# Patient Record
Sex: MALE | Race: OTHER | HISPANIC OR LATINO | Employment: FULL TIME | URBAN - METROPOLITAN AREA
[De-identification: names, ages, dates, MRNs, and addresses within clinical notes are randomized per-mention and may not be internally consistent; named-entity substitution may affect disease eponyms.]

---

## 2017-07-11 ENCOUNTER — ALLSCRIPTS OFFICE VISIT (OUTPATIENT)
Dept: OTHER | Facility: OTHER | Age: 22
End: 2017-07-11

## 2017-07-11 DIAGNOSIS — N47.8 OTHER DISORDERS OF PREPUCE: ICD-10-CM

## 2017-07-11 DIAGNOSIS — K29.70 GASTRITIS WITHOUT BLEEDING: ICD-10-CM

## 2017-07-11 DIAGNOSIS — Z01.89 ENCOUNTER FOR OTHER SPECIFIED SPECIAL EXAMINATIONS: ICD-10-CM

## 2018-01-12 VITALS
HEART RATE: 70 BPM | HEIGHT: 73 IN | TEMPERATURE: 97.3 F | RESPIRATION RATE: 18 BRPM | OXYGEN SATURATION: 98 % | WEIGHT: 187 LBS | BODY MASS INDEX: 24.78 KG/M2 | DIASTOLIC BLOOD PRESSURE: 70 MMHG | SYSTOLIC BLOOD PRESSURE: 118 MMHG

## 2018-07-10 RX ORDER — PANTOPRAZOLE SODIUM 20 MG/1
TABLET, DELAYED RELEASE ORAL
Qty: 30 TABLET | Refills: 1 | OUTPATIENT
Start: 2018-07-10

## 2018-08-17 ENCOUNTER — OFFICE VISIT (OUTPATIENT)
Dept: URGENT CARE | Age: 23
End: 2018-08-17
Payer: COMMERCIAL

## 2018-08-17 VITALS
HEART RATE: 67 BPM | RESPIRATION RATE: 18 BRPM | HEIGHT: 74 IN | BODY MASS INDEX: 23.23 KG/M2 | TEMPERATURE: 97.8 F | OXYGEN SATURATION: 98 % | DIASTOLIC BLOOD PRESSURE: 60 MMHG | SYSTOLIC BLOOD PRESSURE: 131 MMHG | WEIGHT: 181 LBS

## 2018-08-17 DIAGNOSIS — K64.9 HEMORRHOIDS, UNSPECIFIED HEMORRHOID TYPE: Primary | ICD-10-CM

## 2018-08-17 PROCEDURE — 99213 OFFICE O/P EST LOW 20 MIN: CPT | Performed by: PHYSICIAN ASSISTANT

## 2018-08-17 RX ORDER — LIDOCAINE 50 MG/G
OINTMENT TOPICAL AS NEEDED
Qty: 35.44 G | Refills: 0 | Status: SHIPPED | OUTPATIENT
Start: 2018-08-17

## 2018-08-17 NOTE — LETTER
August 17, 2018     Patient: Kiki Lundberg   YOB: 1995   Date of Visit: 8/17/2018       To Whom it May Concern:    Kiki Lundberg was seen in my clinic on 8/17/2018  Please allow patient have a bowel movement if needed  If you have any questions or concerns, please don't hesitate to call           Sincerely,          KIRBY SMALLS        CC: No Recipients

## 2018-08-18 NOTE — PATIENT INSTRUCTIONS
Use medications as direct  Follow up with PCP in 3-5 days  Proceed to  ER if symptoms worsen  Hemorroidectomía   CUIDADO AMBULATORIO:   Lo que usted necesita saber sobre la hemorroidectomía:  Mati Jin hemorroidectomía es [facility] cirugía para extirpar las hemorroides  Cómo me preparo para [place] hemorroidectomía: Zavaleta médico le explicará cómo debe prepararse para la [facility]  Le puede indicar que no consuma ningún alimento ni bebida después de la medianoche del día de la [facility]  Shirley Atkinson qué medicamentos puede wallace el día de la [facility]  Podrían administrarle un antibiótico por vía intravenosa para evitar que contraiga michelle infección bacteriana  Informe a zavaleta médico si usted alguna vez ha tenido michelle reacción alérgica a un antibiótico  [employer] arreglos necesarios para que otra persona lo lleve a zavaleta hogar después de la cirugía  Qué sucederá donaldo la hemorroidectomía:  Pueden administrarle anestesia general para mantenerlo dormido y [place] de dolor donaldo la cirugía  O, en lugar de eso, podrían administrarle anestesia local o raquídea para adormecer el área de la [facility]  Con anestesia local o raquídea, usted todavía podría sentir presión o molestias donaldo la cirugía, ania no debería sentir ningún dolor  El cirujano hará michelle o más incisiones cerca de la hemorroide y [address]  Puede cerrar las incisiones con puntos de sutura o dejarlas abiertas  Puede colocarle gasa en el recto para controlar el sangrado  Young Hint un vendaje sobre las incisiones  Qué sucederá después de michelle hemorroidectomía:  Los médicos lo mantendrán bajo observación hasta que se despierte  Es posible que le den de julian para ir a casa después de la cirugía o que tenga que pasar la noche en el hospital  Fabienne Qualia dolor y sangrado de la incisión donaldo algunos días  Puede tener sangrado con las evacuaciones intestinales donaldo varias semanas  Si tiene [employer], es posible que se la quiten antes de irse a zavaleta casa o en unos días   O, en zavaleta lugar, le pueden indicar que se la quite usted en zavaleta casa  [place] hemorroidectomía:  La hemorroide puede regresar después de la cirugía  Usted podría sangrar más de lo esperado o contraer michelle infección  Puede tener problemas para orinar después de la [place]  Puede que necesite michelle sonda urinaria donaldo unos días deepak ayuda para orinar  Los músculos del recto y el ano pueden dañarse donaldo la [facility] [place]  [place] puede hacer que sea difícil controlar las evacuaciones intestinales  Las herramientas utilizadas para extirpar la hemorroide pueden hacerle michelle perforación en el recto o los intestinos  Es posible que deban hacerle michelle cirugía para reparar esto  Llame al 911 en misha de presentar lo siguiente:   · Usted tiene dificultad para respirar  Busque atención médica de inmediato si:   · La brayden le empapa el vendaje o la ropa interior  · Se desprenden los puntos de sutura  · Usted tiene dolor intenso en el recto o el abdomen  · Usted no puede orinar u orina muy Marielle Marcelina a zavaleta Claryce Levans vitaminas y minerales son adecuados para usted  · Usted tiene fiebre o escalofríos  · Zavaleta dolor no mejora después de wallace zavaleta medicamento para el dolor  · No tiene michelle evacuación intestinal en las 48 horas después de la [place]  · Tiene dolor intenso con la evacuación intestinal      · Zavaleta herida está neel, inflamada o drena pus  · Usted tiene náuseas o está vomitando  · Usted tiene comezón en la piel, inflamación o un sarpullido  · Usted tiene dificultad para controlar mendel evacuaciones intestinales  · Usted tiene preguntas o inquietudes acerca de zavaleta condición o cuidado  Medicamentos:  Es posible que usted necesite alguno de los siguientes:  · Medicamento  para ayudar a disminuir el dolor y la inflamación  El medicamento puede venir deepak almohadilla, crema o ungüento  · Antibióticos  ayudan a evitar michelle infección bacteriana   Podrían administrarse en forma de pastilla o ungüento  · Los laxantes  ayudan a evitar el estreñimiento  · Laxantes  ayudan a tener michelle evacuación intestinal y a prevenir el estreñimiento  · Un medicamento con receta para el dolor  podrían ser Greg Andrea  Pregunte al médico cómo debe wallace hiram medicamento de forma balbuena  Algunos medicamentos recetados para el dolor contienen acetaminofén  No tome otros medicamentos que contengan acetaminofén sin consultarlo con zavaleta médico  Demasiado acetaminofeno puede causar daño al hígado  Los medicamentos recetados para el dolor podrían causar estreñimiento  Pregunte a zavaleta médico deepak prevenir o tratar estreñimiento  · AINEs (Analgésicos antiinflamatorios no esteroides) deepak el ibuprofeno, ayudan a disminuir la inflamación, el dolor y la [place]  Los AINEs pueden causar sangrado estomacal o problemas renales en ciertas personas  Si usted rox un medicamento anticoagulante, siempre pregúntele a zavaleta médico si los [place]OS[place] LU[place] son seguros para usted  Siempre rebeca la etiqueta de hiram medicamento y [place] instrucciones  · Tira mendel medicamentos deepak se le haya indicado  Consulte con zavaleta médico si usted guerrero que zavaleta medicamento no le está ayudando o si presenta efectos secundarios  Infórmele si es alérgico a cualquier medicamento  Mantenga michelle lista actualizada de los [employer], las vitaminas y los productos herbales que rox  Incluya los siguientes datos de los medicamentos: cantidad, frecuencia y motivo de administración  Traiga con usted la lista o los envases de la píldoras a mendel citas de seguimiento  Lleve la lista de los medicamentos con usted en misha de michelle emergencia  Siga las instrucciones de zavaleta médico sobre el cuidado de mendel heridas:   · Quítese el vendaje o las gasas de acuerdo con las indicaciones  Lave cuidadosamente el área alrededor de la herida con [place]  Está shraddha dejar que el agua y jabón corran suavemente por la incisión  Seque el área usando palmaditas suaves   Aplique ungüento o crema según lo indicado  Aplique vendas nuevas y limpias según indicado  Cambie mendel vendajes cuando se mojen o ensucien  · Mantenga limpia el área del ano  Límpiese con [employer] o papel higiénico húmedo después de tener michelle deposición intestinal  El papel higiénico seco podría irritar el área  Use michelle almohadilla sanitaria para absorber el sangrado y [address] el Jerelene Arthur y Fasoula Pafos  Cuidados personales:   · Aplíquese hielo en el ano de 15 a 20 minutos [address] indicaciones  Use un paquete de hielo o ponga hielo molido dentro de [employer]  Envuelva el hielo con michelle toalla antes de colocarlo sobre zaavleta piel  El hielo ayuda a evitar daño al tejido y a disminuir la inflamación y el dolor  · [place] un baño de asiento  Un baño de asiento puede ayudar a aliviar la hinchazón y el dolor  Hágalo 3 veces al día y después de cada evacuación intestinal  Llene michelle marquis de baño con 4 a 6 pulgadas de agua cálida  [address] usar un recipiente para baño de asiento que quepa en el inodoro  Siéntese en el baño de asiento donaldo 15 minutos  · Siéntese sobre michelle almohada o un cojín en forma de brendan  [place] ayuda a aliviar la presión y el dolor en la incisión  Pregunte a zavaleta médico dónde puede comprar un cojín en forma de brendan  Si tiene dolor al sentarse, recuéstese sobre zavaleta costado  · No tenga contacto sexual anal   El contacto sexual anal puede hacer que los puntos de sutura se aston  Pregunte a zavaleta médico por cuánto tiempo necesita seguir estas indicaciones  · No levante nada que pese más que 5 libras  [place] puede aumentar la presión en el recto o el ano y hacer que la incisión se manuela  Prevenga estreñimiento:  Debe intentar tener michelle evacuación intestinal en las 50 horas después de Adline Dub  El estreñimiento puede causar dolor y ejercer presión sobre la incisión  Yvrose lo siguiente para prevenir el estreñimiento:  · [place] suficiente líquidos    Los líquidos pueden ayudar a prevenir el estreñimiento y el esfuerzo  Pregunte cuánto líquido debe wallace cada día y cuáles líquidos son los más adecuados para usted  · Consuma michelle variedad de alimentos ricos en fibra  [place] le ayudará a [employer] intestinos  [facility], se incluyen frutas, vegetales y granos enteros  Pregunte a esquivel médico cuál es la cantidad de fibra que necesita al día  Es posible que deba wallace un suplemento de [place]  · Ejercítese según indicaciones  El hacer ejercicio, deepak caminar, puede servir para que tenga michelle evacuación intestinal  Pregúntele a esquivel médico qué ejercicios son seguros para usted después de Alvaro Hawking  Acuda a mendel consultas de control con esquivel médico según le indicaron  Anote mendel preguntas para que se acuerde de hacerlas donaldo mendel visitas  © 2017 [address] Information is for End User's use only and may not be sold, redistributed or otherwise used for commercial purposes  All illustrations and images included in CareNotes® are the copyrighted property of A D A M , Inc  or Kal Babcock  Esta información es sólo para uso en educación  Esquivel intención no es darle un consejo médico sobre enfermedades o tratamientos  Colsulte con esquivel Hugh Stephen farmacéutico antes de seguir cualquier régimen médico para saber si es seguro y efectivo para usted  Hemorrhoidectomy   AMBULATORY CARE:   What you need to know about a hemorrhoidectomy:  A hemorrhoidectomy is surgery to remove a hemorrhoid  How to prepare for a hemorrhoidectomy:  Your healthcare provider will talk to you about how to prepare for surgery  He may tell you not to eat or drink anything after midnight on the day of your surgery  He will tell you what medicines to take or not take on the day of your surgery  You may be given an antibiotic through your IV to help prevent a bacterial infection  Tell a healthcare provider if you have ever had an allergic reaction to an antibiotic   Arrange for someone to drive you home after surgery  What will happen during a hemorrhoidectomy:  You may be given general anesthesia to keep you asleep and free from pain during surgery  You may instead be given local or spinal anesthesia to numb the surgery area  With local or spinal anesthesia, you may still feel pressure or pushing during surgery, but you should not feel any pain  Your surgeon will make one or more incisions near your hemorrhoid and then remove your hemorrhoid  He may close your incisions with stitches or leave them open  He may place packing in your rectum to control bleeding  A bandage will be placed over your incisions  What will happen after a hemorrhoidectomy:  Healthcare providers will monitor you until you are awake  You may be able to go home after surgery or you may need to spend a night in the hospital  You may have pain and bleeding from your incision for a few days  You may have bleeding with bowel movements for several weeks  If you have packing, it may be removed before you go home or in a few days  You may be told to remove it at home instead  Risks of a hemorrhoidectomy:  Your hemorrhoid may return after surgery  You may bleed more than expected or get an infection  You may have a problem emptying your bladder after surgery  You may need a urinary catheter for a few days to help empty your bladder  Muscles in your rectum and anus may be damaged during surgery  This may make it hard to control your bowel movements  The tools used to remove the hemorrhoid may make a hole in your rectum or bowels  You may need surgery to repair this  Call 911 for any of the following:   · You have trouble breathing  Seek care immediately if:   · Blood soaks through your bandage or underwear  · Your stitches come apart  · You have severe pain in your rectum or abdomen  · You cannot urinate, or you urinate very little  Contact your healthcare provider if:   · You have a fever or chills      · Your pain does not get better after you take pain medicine  · You do not have a bowel movement within 48 hours after surgery  · You have severe pain when you have a bowel movement  · Your wound is red, swollen, or draining pus  · You have nausea or are vomiting  · Your skin is itchy, swollen, or you have a rash  · You have trouble controlling your bowel movements  · You have questions or concerns about your condition or care  Medicines: You may need any of the following:  · Medicine  may be given to decrease pain and swelling  The medicine may come as a pad, cream, or ointment  · Antibiotics  help prevent a bacterial infection  They may be given as a pill or an ointment  · Stool softeners  help prevent constipation  · Laxatives  help you have a bowel movement and prevent constipation  · Prescription pain medicine  may be given  Ask your healthcare provider how to take this medicine safely  Some prescription pain medicines contain acetaminophen  Do not take other medicines that contain acetaminophen without talking to your healthcare provider  Too much acetaminophen may cause liver damage  Prescription pain medicine may cause constipation  Ask your healthcare provider how to prevent or treat constipation  · NSAIDs , such as ibuprofen, help decrease swelling, pain, and fever  NSAIDs can cause stomach bleeding or kidney problems in certain people  If you take blood thinner medicine, always ask your healthcare provider if NSAIDs are safe for you  Always read the medicine label and follow directions  · Take your medicine as directed  Contact your healthcare provider if you think your medicine is not helping or if you have side effects  Tell him or her if you are allergic to any medicine  Keep a list of the medicines, vitamins, and herbs you take  Include the amounts, and when and why you take them  Bring the list or the pill bottles to follow-up visits   Carry your medicine list with you in case of an emergency  Care for your wound as directed:   · Remove your bandage or packing as directed  Carefully wash around the wound with soap and water  It is okay to let soap and water gently run over your incision  Gently pat the area dry  Apply ointment or cream as directed  Put on new, clean bandages as directed  Change your bandages when they get wet or dirty  · Keep your anal area clean  After a bowel movement, wipe with moist towelettes or wet toilet paper  Dry toilet paper can irritate the area  Wear a sanitary pad to absorb bleeding and keep the area clean and dry  Self-care:   · Apply ice on your anus for 15 to 20 minutes every hour or as directed  Use an ice pack, or put crushed ice in a plastic bag  Cover it with a towel before you put it on your skin  Ice helps prevent tissue damage and decreases swelling and pain  · Take a sitz bath  A sitz bath can help decrease pain and swelling  Do this 3 times a day, and after each bowel movement  Fill a bathtub with 4 to 6 inches of warm water  You may also use a sitz bath pan that fits inside a toilet bowl  Sit in the sitz bath for 15 minutes  · Sit on a pillow or a donut-shaped cushion  This helps relieve pressure and pain on your incision  Ask your healthcare provider where to buy a donut-shaped cushion  If you have pain when you sit, lie on your side  · Do not have anal sex  Anal sex can cause your stitches to come apart  Ask your healthcare provider how long you need to follow these instructions  · Do not lift anything heavier than 5 pounds  This can increase pressure in your rectum or anus and cause your incision to come apart  Prevent constipation:  You should try to have a bowel movement within 48 hours after surgery  Constipation can cause pain and put pressure on your incision  Do the following to prevent constipation:  · Drink plenty of liquids  Liquids can help prevent constipation and straining   Ask how much liquid to drink each day and which liquids are best for you  · Eat a variety of high-fiber foods  This will help make it easier to have a bowel movement  Examples include fruits, vegetables, and whole grains  Ask your healthcare provider how much fiber you need each day  You may need to take a fiber supplement  · Exercise as directed  Exercise, such as walking, may make it easier to have a bowel movement  Ask your healthcare provider what exercises are safe for you to do after surgery  Follow up with your healthcare provider as directed:  Write down your questions so you remember to ask them during your visits  © 2017 [address] Information is for End User's use only and may not be sold, redistributed or otherwise used for commercial purposes  All illustrations and images included in CareNotes® are the copyrighted property of A D A M , Inc  or Kal Babcock  The above information is an  only  It is not intended as medical advice for individual conditions or treatments  Talk to your doctor, nurse or pharmacist before following any medical regimen to see if it is safe and effective for you

## 2023-05-31 ENCOUNTER — APPOINTMENT (OUTPATIENT)
Dept: LAB | Facility: CLINIC | Age: 28
End: 2023-05-31
Payer: COMMERCIAL

## 2023-05-31 ENCOUNTER — OFFICE VISIT (OUTPATIENT)
Dept: FAMILY MEDICINE CLINIC | Facility: CLINIC | Age: 28
End: 2023-05-31

## 2023-05-31 VITALS
DIASTOLIC BLOOD PRESSURE: 80 MMHG | WEIGHT: 207.6 LBS | HEART RATE: 74 BPM | SYSTOLIC BLOOD PRESSURE: 126 MMHG | OXYGEN SATURATION: 98 % | BODY MASS INDEX: 26.64 KG/M2 | TEMPERATURE: 98.3 F | RESPIRATION RATE: 18 BRPM | HEIGHT: 74 IN

## 2023-05-31 DIAGNOSIS — Z00.00 ANNUAL PHYSICAL EXAM: Primary | ICD-10-CM

## 2023-05-31 DIAGNOSIS — Z83.3 FAMILY HISTORY OF DIABETES MELLITUS (DM): ICD-10-CM

## 2023-05-31 DIAGNOSIS — Z11.4 SCREENING FOR HIV (HUMAN IMMUNODEFICIENCY VIRUS): ICD-10-CM

## 2023-05-31 DIAGNOSIS — Z00.00 ANNUAL PHYSICAL EXAM: ICD-10-CM

## 2023-05-31 DIAGNOSIS — Z11.59 NEED FOR HEPATITIS C SCREENING TEST: ICD-10-CM

## 2023-05-31 DIAGNOSIS — K64.9 HEMORRHOIDS, UNSPECIFIED HEMORRHOID TYPE: ICD-10-CM

## 2023-05-31 DIAGNOSIS — M25.512 PAIN, JOINT, SHOULDER, LEFT: ICD-10-CM

## 2023-05-31 LAB
ALBUMIN SERPL BCP-MCNC: 3.9 G/DL (ref 3.5–5)
ALP SERPL-CCNC: 75 U/L (ref 46–116)
ALT SERPL W P-5'-P-CCNC: 64 U/L (ref 12–78)
ANION GAP SERPL CALCULATED.3IONS-SCNC: 2 MMOL/L (ref 4–13)
AST SERPL W P-5'-P-CCNC: 29 U/L (ref 5–45)
BASOPHILS # BLD AUTO: 0.02 THOUSANDS/ÂΜL (ref 0–0.1)
BASOPHILS NFR BLD AUTO: 1 % (ref 0–1)
BILIRUB SERPL-MCNC: 0.86 MG/DL (ref 0.2–1)
BUN SERPL-MCNC: 12 MG/DL (ref 5–25)
CALCIUM SERPL-MCNC: 9.2 MG/DL (ref 8.3–10.1)
CHLORIDE SERPL-SCNC: 105 MMOL/L (ref 96–108)
CHOLEST SERPL-MCNC: 169 MG/DL
CO2 SERPL-SCNC: 30 MMOL/L (ref 21–32)
CREAT SERPL-MCNC: 1.2 MG/DL (ref 0.6–1.3)
CREAT UR-MCNC: 229 MG/DL
EOSINOPHIL # BLD AUTO: 0.16 THOUSAND/ÂΜL (ref 0–0.61)
EOSINOPHIL NFR BLD AUTO: 5 % (ref 0–6)
ERYTHROCYTE [DISTWIDTH] IN BLOOD BY AUTOMATED COUNT: 13.1 % (ref 11.6–15.1)
GFR SERPL CREATININE-BSD FRML MDRD: 81 ML/MIN/1.73SQ M
GLUCOSE P FAST SERPL-MCNC: 95 MG/DL (ref 65–99)
HCT VFR BLD AUTO: 47.5 % (ref 36.5–49.3)
HCV AB SER QL: NORMAL
HDLC SERPL-MCNC: 42 MG/DL
HGB BLD-MCNC: 15.4 G/DL (ref 12–17)
HIV 1+2 AB+HIV1 P24 AG SERPL QL IA: NORMAL
HIV 2 AB SERPL QL IA: NORMAL
HIV1 AB SERPL QL IA: NORMAL
HIV1 P24 AG SERPL QL IA: NORMAL
IMM GRANULOCYTES # BLD AUTO: 0.01 THOUSAND/UL (ref 0–0.2)
IMM GRANULOCYTES NFR BLD AUTO: 0 % (ref 0–2)
LDLC SERPL CALC-MCNC: 110 MG/DL (ref 0–100)
LYMPHOCYTES # BLD AUTO: 1.5 THOUSANDS/ÂΜL (ref 0.6–4.47)
LYMPHOCYTES NFR BLD AUTO: 50 % (ref 14–44)
MCH RBC QN AUTO: 26 PG (ref 26.8–34.3)
MCHC RBC AUTO-ENTMCNC: 32.4 G/DL (ref 31.4–37.4)
MCV RBC AUTO: 80 FL (ref 82–98)
MICROALBUMIN UR-MCNC: 8.4 MG/L (ref 0–20)
MICROALBUMIN/CREAT 24H UR: 4 MG/G CREATININE (ref 0–30)
MONOCYTES # BLD AUTO: 0.34 THOUSAND/ÂΜL (ref 0.17–1.22)
MONOCYTES NFR BLD AUTO: 11 % (ref 4–12)
NEUTROPHILS # BLD AUTO: 1 THOUSANDS/ÂΜL (ref 1.85–7.62)
NEUTS SEG NFR BLD AUTO: 33 % (ref 43–75)
NONHDLC SERPL-MCNC: 127 MG/DL
NRBC BLD AUTO-RTO: 0 /100 WBCS
PLATELET # BLD AUTO: 210 THOUSANDS/UL (ref 149–390)
PMV BLD AUTO: 12.6 FL (ref 8.9–12.7)
POTASSIUM SERPL-SCNC: 3.9 MMOL/L (ref 3.5–5.3)
PROT SERPL-MCNC: 7.6 G/DL (ref 6.4–8.4)
RBC # BLD AUTO: 5.93 MILLION/UL (ref 3.88–5.62)
SODIUM SERPL-SCNC: 137 MMOL/L (ref 135–147)
TRIGL SERPL-MCNC: 87 MG/DL
TSH SERPL DL<=0.05 MIU/L-ACNC: 1.71 UIU/ML (ref 0.45–4.5)
WBC # BLD AUTO: 3.03 THOUSAND/UL (ref 4.31–10.16)

## 2023-05-31 PROCEDURE — 85025 COMPLETE CBC W/AUTO DIFF WBC: CPT

## 2023-05-31 PROCEDURE — 80053 COMPREHEN METABOLIC PANEL: CPT

## 2023-05-31 PROCEDURE — 36415 COLL VENOUS BLD VENIPUNCTURE: CPT

## 2023-05-31 PROCEDURE — 82570 ASSAY OF URINE CREATININE: CPT

## 2023-05-31 PROCEDURE — 80061 LIPID PANEL: CPT

## 2023-05-31 PROCEDURE — 84443 ASSAY THYROID STIM HORMONE: CPT

## 2023-05-31 PROCEDURE — 87389 HIV-1 AG W/HIV-1&-2 AB AG IA: CPT

## 2023-05-31 PROCEDURE — 82043 UR ALBUMIN QUANTITATIVE: CPT

## 2023-05-31 PROCEDURE — 86803 HEPATITIS C AB TEST: CPT

## 2023-05-31 NOTE — PROGRESS NOTES
[facility]    NAME: Hattie Lara  AGE: 29 y o  SEX: male  : 1995     DATE: 2023     Assessment and Plan:     Problem List Items Addressed This Visit    None  Visit Diagnoses     Annual physical exam    -  Primary    Relevant Orders    Hepatitis C Antibody    HIV 1/2 AG/AB w Reflex [facility] for 2 yr old and above    CBC and differential    Comprehensive metabolic panel    Lipid panel    Albumin / creatinine urine ratio    TSH, 3rd generation with Free T4 reflex    Hemorrhoids, unspecified hemorrhoid type        Relevant Medications    hydrocortisone 2 5 % cream    Need for hepatitis C screening test        Relevant Orders    Hepatitis C Antibody    Screening for HIV (human immunodeficiency virus)        Relevant Orders    HIV 1/2 AG/AB w Reflex [facility] for 2 yr old and above    Family history of diabetes mellitus (DM)        Relevant Orders    Hepatitis C Antibody    HIV 1/2 AG/AB w Reflex [facility] for 2 yr old and above    CBC and differential    Comprehensive metabolic panel    Lipid panel    Albumin / creatinine urine ratio    TSH, 3rd generation with Free T4 reflex    Pain, joint, shoulder, left        Relevant Medications    Diclofenac Sodium (VOLTAREN) 1 %          Immunizations and preventive care screenings were discussed with patient today  Appropriate education was printed on patient's after visit summary  Counseling:  Dental Health: discussed importance of regular tooth brushing, flossing, and dental visits  No follow-ups on file  Chief Complaint:     Chief Complaint   Patient presents with   • New Patient Visit   • Shoulder Pain      History of Present Illness:     Adult Annual Physical   Patient here for a comprehensive physical exam  The patient reports problems - L shoulder pain  Diet and Physical Activity  Diet/Nutrition: well balanced diet  Exercise: no formal exercise  Depression Screening  PHQ-2/9 Depression Screening    Little interest or pleasure in doing things: 0 - not at all  Feeling down, depressed, or hopeless: 0 - not at all  PHQ-2 Score: 0  PHQ-2 Interpretation: Negative depression screen       General Health  Sleep: sleeps well  Hearing: normal - bilateral   Vision: no vision problems  Dental: no dental visits for >1 year   Health  History of STDs?: no      Review of Systems:     Review of Systems   Past Medical History:     Past Medical History:   Diagnosis Date   • Hemorrhoid       Past Surgical History:     Past Surgical History:   Procedure Laterality Date   • LEG SURGERY        Social History:     Social History     Socioeconomic History   • Marital status: /Civil Union     Spouse name: None   • Number of children: None   • Years of education: None   • Highest education level: None   Occupational History   • None   Tobacco Use   • Smoking status: Never   • Smokeless tobacco: Never   Substance and Sexual Activity   • Alcohol use: No   • Drug use: No   • Sexual activity: None   Other Topics Concern   • None   Social History Narrative   • None     Social Determinants of Health     Financial Resource Strain: Low Risk  (5/31/2023)    Overall Financial Resource Strain (CARDIA)    • Difficulty of Paying Living Expenses: Not hard at all   Food Insecurity: No Food Insecurity (5/31/2023)    Hunger Vital Sign    • Worried About Running Out of Food in the Last Year: Never true    • Ran Out of Food in the Last Year: Never true   Transportation Needs: No Transportation Needs (5/31/2023)    PRAPARE - Transportation    • Lack of Transportation (Medical): No    • Lack of Transportation (Non-Medical):  No   Physical Activity: Not on file   Stress: Not on file   Social Connections: Not on file   Intimate Partner Violence: Not on file   Housing Stability: Not on file      Family History:     Family History   Problem Relation Age of Onset   • No Known Problems "Mother    • No Known Problems Father    • No Known Problems Sister    • No Known Problems Brother    • Cancer Maternal Grandmother    • Diabetes Paternal Grandmother       Current Medications:     Current Outpatient Medications   Medication Sig Dispense Refill   • Diclofenac Sodium (VOLTAREN) 1 % Apply 2 g topically 4 (four) times a day 350 g 1   • hydrocortisone 2 5 % cream Apply topically 3 (three) times a day 28 g 1   • lidocaine (XYLOCAINE) 5 % ointment Apply topically as needed for mild pain 35 44 g 0     No current facility-administered medications for this visit  Allergies: Allergies   Allergen Reactions   • Sea-Omega [Fish Oil - Food Allergy] Hives      Physical Exam:     /80 (BP Location: Right arm, Patient Position: Sitting, Cuff Size: Standard)   Pulse 74   Temp 98 3 °F (36 8 °C) (Temporal)   Resp 18   Ht 6' 2\" (1 88 m)   Wt 94 2 kg (207 lb 9 6 oz)   SpO2 98%   BMI 26 65 kg/m²     Physical Exam  Vitals and nursing note reviewed  Constitutional:       General: He is not in acute distress  Appearance: He is well-developed  HENT:      Head: Normocephalic and atraumatic  Eyes:      Conjunctiva/sclera: Conjunctivae normal    Cardiovascular:      Rate and Rhythm: Normal rate and regular rhythm  Heart sounds: No murmur heard  Pulmonary:      Effort: Pulmonary effort is normal  No respiratory distress  Breath sounds: Normal breath sounds  Abdominal:      Palpations: Abdomen is soft  Tenderness: There is no abdominal tenderness  Musculoskeletal:         General: No swelling  Cervical back: Neck supple  Skin:     General: Skin is warm and dry  Capillary Refill: Capillary refill takes less than 2 seconds  Neurological:      Mental Status: He is alert  Psychiatric:         Mood and Affect: Mood normal           MD [place] 70 BMI Counseling: Body mass index is 26 65 kg/m²   The BMI is above normal  " Nutrition recommendations include reducing portion sizes, decreasing overall calorie intake, 3-5 servings of fruits/vegetables daily, reducing fast food intake, consuming healthier snacks, decreasing soda and/or juice intake, moderation in carbohydrate intake, increasing intake of lean protein, reducing intake of saturated fat and trans fat and reducing intake of cholesterol  Exercise recommendations include moderate aerobic physical activity for 150 minutes/week, exercising 3-5 times per week and strength training exercises

## 2023-05-31 NOTE — PROGRESS NOTES
[facility]        NAME: Keegan Morales is a 21 y o  male  : 1995    MRN: [id number]  DATE: 2018  TIME: 8:23 PM    Assessment and Plan   Hemorrhoids, unspecified hemorrhoid type [K64 9]  1  Hemorrhoids, unspecified hemorrhoid type  hydrocortisone (ANUSOL-HC, PROCTOSOL HC,) 2 5 % rectal cream    lidocaine (XYLOCAINE) 5 % ointment         Patient Instructions     Use medications as direct  Follow up with PCP in 3-5 days  Proceed to  ER if symptoms worsen  Chief Complaint   No chief complaint on file  History of Present Illness       19-year-old male presents with a several month history of intermittent abdominal pain  Patient also reports noticed some blood when he wiped a couple times  Patient reports he has mild pain with bowel movement and from time to time in itching and the anus area  No fevers chills nausea vomiting diarrhea  Still eating and drinking normally  Abdominal Pain   This is a recurrent problem  The current episode started more than 1 month ago  The onset quality is sudden  The problem occurs intermittently  The problem has been resolved  The pain is located in the LLQ, RUQ and epigastric region  The pain is mild  The quality of the pain is colicky and aching  The abdominal pain does not radiate  Pertinent negatives include no constipation, diarrhea, flatus, frequency, headaches, melena, myalgias, nausea or vomiting  Nothing aggravates the pain  The pain is relieved by nothing  He has tried nothing for the symptoms  The treatment provided no relief  Review of Systems   Review of Systems   Constitutional: Negative  HENT: Negative  Eyes: Negative  Cardiovascular: Negative  Gastrointestinal: Positive for abdominal pain, blood in stool (On toilet paper) and rectal pain  Negative for constipation, diarrhea, flatus, melena, nausea and vomiting  Genitourinary: Negative  Negative for frequency  Musculoskeletal: Negative    Negative for myalgias  Skin: Negative  Neurological: Negative for headaches  Current Medications       Current Outpatient Prescriptions:     hydrocortisone (ANUSOL-HC, PROCTOSOL HC,) 2 5 % rectal cream, Insert into the rectum 2 (two) times a day, Disp: 30 g, Rfl: 0    lidocaine (XYLOCAINE) 5 % ointment, Apply topically as needed for mild pain, Disp: 35 44 g, Rfl: 0    Current Allergies     Allergies as of 08/17/2018    (No Known Allergies)            The following portions of the patient's history were reviewed and updated as appropriate: allergies, current medications, past family history, past medical history, past social history, past surgical history and problem list      History reviewed  No pertinent past medical history  No past surgical history on file  Family History   Problem Relation Age of Onset    No Known Problems Mother     No Known Problems Father     No Known Problems Sister     No Known Problems Brother     Cancer Maternal Grandmother     Diabetes Paternal Grandmother          Medications have been verified  Objective   /60 (BP Location: Left arm, Patient Position: Sitting, Cuff Size: Adult)   Pulse 67   Temp 97 8 °F (36 6 °C) (Tympanic)   Resp 18   Ht 6' 2" (1 88 m)   Wt 82 1 kg (181 lb)   SpO2 98%   BMI 23 24 kg/m²        Physical Exam     Physical Exam   Constitutional: He is oriented to person, place, and time  He appears well-developed and well-nourished  No distress  HENT:   Head: Normocephalic and atraumatic  Right Ear: External ear normal    Left Ear: External ear normal    Nose: Nose normal    Mouth/Throat: Oropharynx is clear and moist  No oropharyngeal exudate  Eyes: Conjunctivae are normal  Right eye exhibits no discharge  Left eye exhibits no discharge  Neck: Normal range of motion  Neck supple  Cardiovascular: Normal rate, regular rhythm, normal heart sounds and intact distal pulses  No murmur heard    Pulmonary/Chest: Effort normal and breath sounds normal  No respiratory distress  He has no wheezes  He has no rales  Abdominal: Soft  Bowel sounds are normal  There is no tenderness  Genitourinary: Rectal exam shows external hemorrhoid (Small external hemorrhoid noted  At 6 o'clock)  Musculoskeletal: Normal range of motion  Lymphadenopathy:     He has no cervical adenopathy  Neurological: He is alert and oriented to person, place, and time  Skin: Skin is warm and dry  Psychiatric: He has a normal mood and affect  Nursing note and vitals reviewed  No

## 2024-06-05 ENCOUNTER — HOSPITAL ENCOUNTER (EMERGENCY)
Facility: HOSPITAL | Age: 29
Discharge: HOME | End: 2024-06-05
Attending: EMERGENCY MEDICINE
Payer: COMMERCIAL

## 2024-06-05 VITALS
RESPIRATION RATE: 22 BRPM | DIASTOLIC BLOOD PRESSURE: 98 MMHG | SYSTOLIC BLOOD PRESSURE: 158 MMHG | HEART RATE: 72 BPM | TEMPERATURE: 98.2 F | OXYGEN SATURATION: 95 %
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
## 2024-06-05 PROCEDURE — 96372 THER/PROPH/DIAG INJ SC/IM: CPT

## 2024-06-05 PROCEDURE — 99284 EMERGENCY DEPT VISIT MOD MDM: CPT

## 2024-06-05 PROCEDURE — 99284 EMERGENCY DEPT VISIT MOD MDM: CPT | Performed by: EMERGENCY MEDICINE

## 2024-06-05 RX ORDER — KETOROLAC TROMETHAMINE 30 MG/ML
15 INJECTION, SOLUTION INTRAMUSCULAR; INTRAVENOUS ONCE
Status: COMPLETED | OUTPATIENT
Start: 2024-06-05 | End: 2024-06-05

## 2024-06-05 RX ADMIN — KETOROLAC TROMETHAMINE 15 MG: 30 INJECTION, SOLUTION INTRAMUSCULAR; INTRAVENOUS at 19:19

## 2024-06-05 NOTE — Clinical Note
Jose Bennett was seen and treated in our emergency department on 6/5/2024.                Diagnosis:     Jose  may return to work on return date.    He may return on this date: 06/07/2024         If you have any questions or concerns, please don't hesitate to call.      hCris Chahal MD    ______________________________           _______________          _______________  Hospital Representative                              Date                                Time
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
## 2024-08-01 ENCOUNTER — OFFICE VISIT (OUTPATIENT)
Dept: FAMILY MEDICINE CLINIC | Facility: CLINIC | Age: 29
End: 2024-08-01

## 2024-08-01 VITALS
RESPIRATION RATE: 16 BRPM | TEMPERATURE: 98 F | DIASTOLIC BLOOD PRESSURE: 84 MMHG | OXYGEN SATURATION: 98 % | HEIGHT: 74 IN | WEIGHT: 214 LBS | HEART RATE: 66 BPM | SYSTOLIC BLOOD PRESSURE: 134 MMHG | BODY MASS INDEX: 27.46 KG/M2

## 2024-08-01 DIAGNOSIS — M67.912 TENDINOPATHY OF LEFT SHOULDER: ICD-10-CM

## 2024-08-01 DIAGNOSIS — M54.42 CHRONIC LEFT-SIDED LOW BACK PAIN WITH LEFT-SIDED SCIATICA: Primary | ICD-10-CM

## 2024-08-01 DIAGNOSIS — F41.9 ANXIETY: ICD-10-CM

## 2024-08-01 DIAGNOSIS — G89.29 CHRONIC LEFT-SIDED LOW BACK PAIN WITH LEFT-SIDED SCIATICA: Primary | ICD-10-CM

## 2024-08-01 DIAGNOSIS — M54.12 CERVICAL RADICULOPATHY: ICD-10-CM

## 2024-08-01 DIAGNOSIS — K64.9 HEMORRHOIDS, UNSPECIFIED HEMORRHOID TYPE: ICD-10-CM

## 2024-08-01 PROCEDURE — 99214 OFFICE O/P EST MOD 30 MIN: CPT

## 2024-08-01 RX ORDER — ESCITALOPRAM OXALATE 10 MG/1
10 TABLET ORAL DAILY
Qty: 90 TABLET | Refills: 0 | Status: SHIPPED | OUTPATIENT
Start: 2024-08-01

## 2024-08-01 RX ORDER — IBUPROFEN 200 MG
600 TABLET ORAL EVERY 6 HOURS PRN
COMMUNITY

## 2024-08-01 RX ORDER — LIDOCAINE 50 MG/G
OINTMENT TOPICAL AS NEEDED
Qty: 35.44 G | Refills: 0 | Status: SHIPPED | OUTPATIENT
Start: 2024-08-01

## 2024-08-01 RX ORDER — METHOCARBAMOL 500 MG/1
500 TABLET, FILM COATED ORAL 4 TIMES DAILY
Qty: 90 TABLET | Refills: 0 | Status: SHIPPED | OUTPATIENT
Start: 2024-08-01

## 2024-08-01 NOTE — PATIENT INSTRUCTIONS
If you would like to be added to the wait list for services within [facility], you will need to contact them directly at [facility] - [phone]    Outpatient Mental Health Resources     [place] Suicide Prevention Lifeline: Dial #149  If you prefer to text, you can reach the Crisis Text Line by texting “[place]” to 144746    ¿Te encuentras en crisis? Envía un mensaje de texto con la palabra AYUDA al [phone] para comunicarte de manera gratuita con un Consejero de Crisis  Apoyo gratuito las 24 horas del día, los 7 días de la semana, al alcance de tu mano.    [facility] CRISIS for mental health emergencies and/or please go to your local Emergency Department Call [phone] if you or a loved one are in a mental health crisis.  You can Visit https://www.[facility].[place].gov/Services/Mental-Health-In-[place]/Documents/Suicide_Prevention_Hotlines.pdf to find 24/7 crisis phone line for other [facility].    [facility]  If you have NO insurance for outpatient Mental Health services you will need to have a liability appointment with [facility] to assess you to qualify for funding. [facility] does NOT provide funding for Medications.  Please call [phone] or [phone] to schedule an intake assessment    [facility]   ? Location 1: [facility] [place] [address] [phone]   ? Location 2: [facility] [address] [place] [address] [phone]        ? English only* Serves ages 4+          ? Accepts Medicare and some commercial plans    [facility]   ? [address] [place] [address] [phone]; [phone]  ? Bilingual English/Angolan Serves ages 5+   ? Accepts Medical Assistance     [facility]   ? [address] [place] [address] [phone]   ? Bilingual English/Angolan Serves ages 14+   ? Accepts Medical Assistance, (Not currently accepting Medicare), & Major Commercial Insurances     [facility]   ? [address]  [place] [address] [phone]        ? Bilingual English/Cambodian Serves ages 6+   ? Accepts Medical Assistance & Commercial Insurance     [facility]   ? Previous [address] location is closed  ? [address] [facility], [address] [phone]   ? Bilingual English/Cambodian Serves ages 3+   ? Accepts Medical Assistance & Some Commercial Insurance     [facility]   ? [address] [facility] [address] [place] [address] [phone]   ? Bilingual English/Cambodian Serves ages 5+   ? Accepts Medical Assistance     [facility]   ? [address] [place] [address] [phone]  ? Bilingual English/Cambodian Serves ages 3+   ? Accepts Medical Assistance & Some Commercial Insurance     [facility]AS[facility]   ? Location 1: [address] [place] [address] [phone]        ? Location 2: [address] [place] [address]   ? Bilingual English/Cambodian Serves ages 18+  ? Accepts Medical Assistance    [facility], [facility]  ? [address], [place] [address] [phone]  ? Bilingual English/Cambodian  ? Accepts Aetna, Cigna, [facility], [facility], [employer], Medicare, [employer]/[facility], [facility]. No Medical Assistance    [facility] ([phone])  Medicare/Medicare Advantage, Medical Assistance/HealthChoices, Commercial, and self-pay as payment.    [facility]         ? [address], at [facility], [place] [address]; [phone]         ? Bilingual English/Cambodian Serves ages 6+         ? Accepts Medical Assistance     TEEN HELP LINE  ? 0-006-965-[phone]    CRIME VICTIMS [facility]       ? [phone]       ? 24/7 [facility] Hotline    [facility]       ? [phone]       ? 24/7 [facility]ocate Hotline    www.psychologytoday.Zinkia is a resource to find psychotherapy providers, patients can filter therapist search list based on a number of criteria including language, specialty, gender, insurance, etc.  Individuals seeking will need to reach out to perspective providers through information in the directory. You are encouraged to contact multiple providers to given that many providers have a significant wait list for services as well as to find a provider is a good fit for you!    [facility] is an organization of families, friends and individuals whose lives have been affected by mental illness. Together, we advocate for better lives for those individuals who have a m)ental illness. Visit: [facility].org to learn more or to search for local support resources including qualified mental health providers.
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
## 2024-08-01 NOTE — ASSESSMENT & PLAN NOTE
GAD7 score is 17. Neg phq9  - Reviewed lifestyle management such as exercise, meditation, drinking 64 oz of water daily, and following a healthy diet.  Start Lexapro 10 mg daily.  Discussed expected outcomes and possible side effects.   Provided patient with list of local mental health resources

## 2024-08-05 ENCOUNTER — HOSPITAL ENCOUNTER (OUTPATIENT)
Dept: RADIOLOGY | Facility: HOSPITAL | Age: 29
Discharge: HOME | End: 2024-08-05
Payer: COMMERCIAL

## 2024-08-05 ENCOUNTER — APPOINTMENT (OUTPATIENT)
Dept: LAB | Facility: HOSPITAL | Age: 29
End: 2024-08-05
Payer: COMMERCIAL

## 2024-08-05 DIAGNOSIS — F41.9 ANXIETY: ICD-10-CM

## 2024-08-05 DIAGNOSIS — M67.912 TENDINOPATHY OF LEFT SHOULDER: ICD-10-CM

## 2024-08-05 DIAGNOSIS — G89.29 CHRONIC LEFT-SIDED LOW BACK PAIN WITH LEFT-SIDED SCIATICA: ICD-10-CM

## 2024-08-05 DIAGNOSIS — M54.12 CERVICAL RADICULOPATHY: ICD-10-CM

## 2024-08-05 DIAGNOSIS — M54.42 CHRONIC LEFT-SIDED LOW BACK PAIN WITH LEFT-SIDED SCIATICA: ICD-10-CM

## 2024-08-05 LAB
25(OH)D3 SERPL-MCNC: 21.5 NG/ML (ref 30–100)
ALBUMIN SERPL BCG-MCNC: 4.6 G/DL (ref 3.5–5)
ALP SERPL-CCNC: 66 U/L (ref 34–104)
ALT SERPL W P-5'-P-CCNC: 56 U/L (ref 7–52)
ANION GAP SERPL CALCULATED.3IONS-SCNC: 9 MMOL/L (ref 4–13)
AST SERPL W P-5'-P-CCNC: 24 U/L (ref 13–39)
BASOPHILS # BLD AUTO: 0.04 THOUSANDS/ÂΜL (ref 0–0.1)
BASOPHILS NFR BLD AUTO: 1 % (ref 0–1)
BILIRUB SERPL-MCNC: 1.07 MG/DL (ref 0.2–1)
BUN SERPL-MCNC: 14 MG/DL (ref 5–25)
CALCIUM SERPL-MCNC: 9.8 MG/DL (ref 8.4–10.2)
CHLORIDE SERPL-SCNC: 102 MMOL/L (ref 96–108)
CHOLEST SERPL-MCNC: 196 MG/DL
CO2 SERPL-SCNC: 28 MMOL/L (ref 21–32)
CREAT SERPL-MCNC: 1.08 MG/DL (ref 0.6–1.3)
EOSINOPHIL # BLD AUTO: 0.1 THOUSAND/ÂΜL (ref 0–0.61)
EOSINOPHIL NFR BLD AUTO: 3 % (ref 0–6)
ERYTHROCYTE [DISTWIDTH] IN BLOOD BY AUTOMATED COUNT: 13 % (ref 11.6–15.1)
GFR SERPL CREATININE-BSD FRML MDRD: 92 ML/MIN/1.73SQ M
GLUCOSE SERPL-MCNC: 115 MG/DL (ref 65–140)
HCT VFR BLD AUTO: 47.6 % (ref 36.5–49.3)
HDLC SERPL-MCNC: 42 MG/DL
HGB BLD-MCNC: 15.9 G/DL (ref 12–17)
IMM GRANULOCYTES # BLD AUTO: 0.01 THOUSAND/UL (ref 0–0.2)
IMM GRANULOCYTES NFR BLD AUTO: 0 % (ref 0–2)
LDLC SERPL CALC-MCNC: 126 MG/DL (ref 0–100)
LYMPHOCYTES # BLD AUTO: 1.44 THOUSANDS/ÂΜL (ref 0.6–4.47)
LYMPHOCYTES NFR BLD AUTO: 45 % (ref 14–44)
MCH RBC QN AUTO: 25.6 PG (ref 26.8–34.3)
MCHC RBC AUTO-ENTMCNC: 33.4 G/DL (ref 31.4–37.4)
MCV RBC AUTO: 77 FL (ref 82–98)
MONOCYTES # BLD AUTO: 0.3 THOUSAND/ÂΜL (ref 0.17–1.22)
MONOCYTES NFR BLD AUTO: 9 % (ref 4–12)
NEUTROPHILS # BLD AUTO: 1.35 THOUSANDS/ÂΜL (ref 1.85–7.62)
NEUTS SEG NFR BLD AUTO: 42 % (ref 43–75)
NONHDLC SERPL-MCNC: 154 MG/DL
NRBC BLD AUTO-RTO: 0 /100 WBCS
PLATELET # BLD AUTO: 226 THOUSANDS/UL (ref 149–390)
PMV BLD AUTO: 11.4 FL (ref 8.9–12.7)
POTASSIUM SERPL-SCNC: 3.8 MMOL/L (ref 3.5–5.3)
PROT SERPL-MCNC: 7.6 G/DL (ref 6.4–8.4)
RBC # BLD AUTO: 6.22 MILLION/UL (ref 3.88–5.62)
SODIUM SERPL-SCNC: 139 MMOL/L (ref 135–147)
TRIGL SERPL-MCNC: 141 MG/DL
TSH SERPL DL<=0.05 MIU/L-ACNC: 1.13 UIU/ML (ref 0.45–4.5)
WBC # BLD AUTO: 3.24 THOUSAND/UL (ref 4.31–10.16)

## 2024-08-05 PROCEDURE — 72110 X-RAY EXAM L-2 SPINE 4/>VWS: CPT

## 2024-08-05 PROCEDURE — 73030 X-RAY EXAM OF SHOULDER: CPT

## 2024-08-05 PROCEDURE — 80061 LIPID PANEL: CPT

## 2024-08-05 PROCEDURE — 85025 COMPLETE CBC W/AUTO DIFF WBC: CPT

## 2024-08-05 PROCEDURE — 82306 VITAMIN D 25 HYDROXY: CPT

## 2024-08-05 PROCEDURE — 84443 ASSAY THYROID STIM HORMONE: CPT

## 2024-08-05 PROCEDURE — 80053 COMPREHEN METABOLIC PANEL: CPT

## 2024-08-05 PROCEDURE — 72050 X-RAY EXAM NECK SPINE 4/5VWS: CPT

## 2024-08-05 PROCEDURE — 36415 COLL VENOUS BLD VENIPUNCTURE: CPT

## 2024-08-06 DIAGNOSIS — D70.8 OTHER NEUTROPENIA (HCC): Primary | ICD-10-CM

## 2024-08-06 DIAGNOSIS — E55.9 VITAMIN D DEFICIENCY: ICD-10-CM

## 2024-08-09 ENCOUNTER — OFFICE VISIT (OUTPATIENT)
Dept: HEMATOLOGY ONCOLOGY | Facility: CLINIC | Age: 29
End: 2024-08-09
Payer: COMMERCIAL

## 2024-08-09 VITALS
TEMPERATURE: 97.6 F | DIASTOLIC BLOOD PRESSURE: 84 MMHG | RESPIRATION RATE: 18 BRPM | OXYGEN SATURATION: 98 % | BODY MASS INDEX: 27.59 KG/M2 | SYSTOLIC BLOOD PRESSURE: 128 MMHG | HEART RATE: 76 BPM | WEIGHT: 215 LBS | HEIGHT: 74 IN

## 2024-08-09 DIAGNOSIS — R71.8 ELEVATED RED BLOOD CELL COUNT: ICD-10-CM

## 2024-08-09 DIAGNOSIS — D70.8 OTHER NEUTROPENIA (HCC): Primary | ICD-10-CM

## 2024-08-09 DIAGNOSIS — Z86.19 FREQUENT INFECTIONS: ICD-10-CM

## 2024-08-09 DIAGNOSIS — R61 UNEXPLAINED NIGHT SWEATS: ICD-10-CM

## 2024-08-09 DIAGNOSIS — R71.8 MICROCYTOSIS: ICD-10-CM

## 2024-08-09 DIAGNOSIS — R53.83 FATIGUE, UNSPECIFIED TYPE: ICD-10-CM

## 2024-08-09 PROCEDURE — 99203 OFFICE O/P NEW LOW 30 MIN: CPT

## 2024-08-09 NOTE — PROGRESS NOTES
Oncology Outpatient Consult Note  Jose Bennett 29 y.o. male MRN: @ Encounter: [id number]        Date:  8/9/2024        CC:  Neutropenia      HPI:  Jose Bennett is seen for initial consultation 8/9/2024 regarding neutropenia.  Patient has a PMH significant for chronic back pain, cervical radiculopathy.  He is here with his significant other.  Patient is primarily Polish speaking, office visit completed using  service,  ID# [id number].      Patient only has two sets of labs in his history, which are dated 5/31/2023 and 8/5/2023.  Patient is noted to be neutropenic since 2023.  Patient endorses increased fatigue, frequent infections, increased frequency of headaches, drenching night sweats and chest pain.  Denies fevers, decreased appetite or unintentional weight loss.  Patient denies diarrhea or constipation.  No palpitations or SOB.  Patient denies abnormal bleeding: epistaxis, gingival bleeding, hematuria, dark tarry stools.      Patient reported tenderness RUQ on palpation.  No lymphadenopathy or hepatosplenomegaly noted on assessment.    Patient is a non-smoker, drinks alcohol socially.  Denies a family history of cancer.    Labs:  8/5/2024: WBC 3.24, ANC 1.35, lymphocytes 45%, Hgb 15.9, MCV 77, RBC 6.22, ALT 56, AST 24, T. Bili 1.07    5/31/2023: WBC 3.03, ANC 1.00, lymphocytes 50%, Hgb 15.4, MCV 80, RBC 5.93, ALT 64, AST 29, T. Bili 0.86, HIV panel non-reactive      ROS: As stated in history of present illness otherwise her 14 point review of systems today was negative.    ECOG PS: 0    Test Results:    Imaging: XR spine lumbar minimum 4 views non injury    Result Date: 8/5/2024  Narrative: LUMBAR SPINE INDICATION:   Lumbago with sciatica, left side. Other chronic pain. COMPARISON:  None. VIEWS:  XR SPINE LUMBAR MINIMUM 4 VIEWS NON INJURY FINDINGS: There are 5 non rib bearing lumbar vertebral bodies. There is no evidence of acute fracture or destructive osseous lesion. Alignment is  unremarkable. No significant lumbar degenerative change noted. The pedicles appear intact. Soft tissues are unremarkable.     Impression: Normal examination. Electronically signed: 08/05/2024 08:32 PM Anthony Sloan MD    XR shoulder 2+ vw left    Result Date: 8/5/2024  Narrative: LEFT SHOULDER INDICATION:   Unspecified disorder of synovium and tendon, left shoulder. COMPARISON:  None. VIEWS:  XR SHOULDER 2+ VW LEFT FINDINGS: There is no acute fracture or dislocation. No significant degenerative changes. No lytic or blastic osseous lesion. Soft tissues are unremarkable.     Impression: No acute osseous abnormality. Electronically signed: 08/05/2024 08:32 PM Anthony Sloan MD    XR spine cervical complete 4 or 5 vw non injury    Result Date: 8/5/2024  Narrative: CERVICAL SPINE INDICATION:   Radiculopathy, cervical region. COMPARISON: None. VIEWS:  XR SPINE CERVICAL COMPLETE 4 OR 5 VW NON INJURY Images: 5 FINDINGS: No fracture. Normal alignment without subluxation. The intervertebral disc spaces are preserved. The neuroforamina are patent. The prevertebral soft tissues are within normal limits.  The lung apices are clear.     Impression: Unremarkable cervical spine. Electronically signed: 08/05/2024 03:54 PM Isaac Castellanos MPH,MD      Labs:   Lab Results   Component Value Date    WBC 3.24 (L) 08/05/2024    HGB 15.9 08/05/2024    HCT 47.6 08/05/2024    MCV 77 (L) 08/05/2024     08/05/2024     Lab Results   Component Value Date    K 3.8 08/05/2024     08/05/2024    CO2 28 08/05/2024    BUN 14 08/05/2024    CREATININE 1.08 08/05/2024    GLUF 95 05/31/2023    CALCIUM 9.8 08/05/2024    AST 24 08/05/2024    ALT 56 (H) 08/05/2024    ALKPHOS 66 08/05/2024    EGFR 92 08/05/2024     Active Problems:   Patient Active Problem List   Diagnosis    Hemorrhoid    Anxiety    Tendinopathy of left shoulder    Cervical radiculopathy    Chronic left-sided low back pain with left-sided sciatica       Past Medical History:   Past  Medical History:   Diagnosis Date    Hemorrhoid      Surgical History:   Past Surgical History:   Procedure Laterality Date    LEG SURGERY       Family History:    Family History   Problem Relation Age of Onset    No Known Problems Mother     No Known Problems Father     No Known Problems Sister     No Known Problems Brother     Cancer Maternal Grandmother     Diabetes Paternal Grandmother        Cancer-related family history includes Cancer in his maternal grandmother.    Social History:   Social History     Socioeconomic History    Marital status: /Civil Union     Spouse name: Not on file    Number of children: Not on file    Years of education: Not on file    Highest education level: Not on file   Occupational History    Not on file   Tobacco Use    Smoking status: Never    Smokeless tobacco: Never   Vaping Use    Vaping status: Never Used   Substance and Sexual Activity    Alcohol use: No    Drug use: No    Sexual activity: Not on file   Other Topics Concern    Not on file   Social History Narrative    Not on file     Social Determinants of Health     Financial Resource Strain: Low Risk  (8/1/2024)    Overall Financial Resource Strain (CARDIA)     Difficulty of Paying Living Expenses: Not hard at all   Food Insecurity: No Food Insecurity (8/1/2024)    Hunger Vital Sign     Worried About Running Out of Food in the Last Year: Never true     Ran Out of Food in the Last Year: Never true   Transportation Needs: No Transportation Needs (8/1/2024)    PRAPARE - Transportation     Lack of Transportation (Medical): No     Lack of Transportation (Non-Medical): No   Physical Activity: Not on file   Stress: Not on file   Social Connections: Not on file   Intimate Partner Violence: Not on file   Housing Stability: Low Risk  (8/1/2024)    Housing Stability Vital Sign     Unable to Pay for Housing in the Last Year: No     Number of Times Moved in the Last Year: 1     Homeless in the Last Year: No       Current  Medications:   Current Outpatient Medications   Medication Sig Dispense Refill    Cholecalciferol (VITAMIN D3) 1,000 units tablet Take 1 tablet (1,000 Units total) by mouth daily 90 tablet 0    Diclofenac Sodium (VOLTAREN) 1 % Apply 2 g topically 4 (four) times a day 350 g 1    escitalopram (Lexapro) 10 mg tablet Take 1 tablet (10 mg total) by mouth daily 90 tablet 0    hydrocortisone 2.5 % cream Apply topically 3 (three) times a day 28 g 1    ibuprofen (MOTRIN) 200 mg tablet Take 600 mg by mouth every 6 (six) hours as needed      lidocaine (XYLOCAINE) 5 % ointment Apply topically as needed for mild pain 35.44 g 0    methocarbamol (ROBAXIN) 500 mg tablet Take 1 tablet (500 mg total) by mouth 4 (four) times a day 90 tablet 0     No current facility-administered medications for this visit.       Allergies:   Allergies   Allergen Reactions    [place] [Fish Oil - Food Allergy] Hives         Physical Exam:    Body surface area is 2.24 meters squared.    Wt Readings from Last 3 Encounters:   08/09/24 97.5 kg (215 lb)   08/01/24 97.1 kg (214 lb)   05/31/23 94.2 kg (207 lb 9.6 oz)        Temp Readings from Last 3 Encounters:   08/09/24 97.6 °F (36.4 °C) (Temporal)   08/01/24 98 °F (36.7 °C) (Temporal)   06/05/24 98.2 °F (36.8 °C) (Oral)        BP Readings from Last 3 Encounters:   08/09/24 128/84   08/01/24 134/84   06/05/24 158/98         Pulse Readings from Last 3 Encounters:   08/09/24 76   08/01/24 66   06/05/24 72     @LASTSAO2(3)@    Physical Exam     Constitutional   General appearance: No acute distress, well appearing and well nourished.    Eyes   Conjunctiva and lids: No swelling, erythema or discharge.    Pupils and irises: Equal, round and reactive to light.    Ears, Nose, Mouth, and Throat   External inspection of ears and nose: Normal.    Nasal mucosa, septum, and turbinates: Normal without edema or erythema.    Oropharynx: Normal with no erythema, edema, exudate or lesions.    Pulmonary   Respiratory  effort: No increased work of breathing or signs of respiratory distress.    Auscultation of lungs: Clear to auscultation.    Cardiovascular   Palpation of heart: Normal PMI, no thrills.    Auscultation of heart: Normal rate and rhythm, normal S1 and S2, without murmurs.    Examination of extremities for edema and/or varicosities: Normal.    Carotid pulses: Normal.    Abdomen   Abdomen: Non-tender, no masses.    Liver and spleen: No hepatomegaly or splenomegaly.    Lymphatic   Palpation of lymph nodes in neck: No lymphadenopathy.    Musculoskeletal   Gait and station: Normal.    Digits and nails: Normal without clubbing or cyanosis.    Inspection/palpation of joints, bones, and muscles: Normal.    Skin   Skin and subcutaneous tissue: Normal without rashes or lesions.    Neurologic   Cranial nerves: Cranial nerves 2-12 intact.    Sensation: No sensory loss.    Psychiatric   Orientation to person, place, and time: Normal.    Mood and affect: Normal.          Assessment/ Plan:  29-year-old male with neutropenia.  Discussed etiologies of neutropenia include, but not limited to, by an inflammatory process, infections, viruses, nutritional malabsorption, autoimmune disorders or bone marrow disorders.  We will further evaluate by obtaining CRP/Sed rate, MATT screen, LDH, vitamin B12 and folate and a flow cytometry.    In addition, patient is noted to have elevated RBCs with microcytosis, which is usually seen in thalassemia.  Patient is agreeable to be tested, order placed.    His symptoms of increased fatigue, drenching night sweats, frequent infections and RUQ tenderness is  concerning.  We will obtain a CT chest abdomen pelvis w contrast to rule out any lymphadenopathy or masses.  Patient is agreeable.      We will see patient back in the office a week after the CT scan to review  the above.  Recommend he get his labs completed prior to the office visit.  Patient agreeable with plan.  Patient aware to contact us for  worsening symptoms or for additional questions/concerns.    I spent 40 minutes in chart review, face to face counseling, coordination of care, and documentation.

## 2024-08-15 ENCOUNTER — HOSPITAL ENCOUNTER (OUTPATIENT)
Dept: CT IMAGING | Facility: HOSPITAL | Age: 29
End: 2024-08-15
Payer: COMMERCIAL

## 2024-08-15 ENCOUNTER — APPOINTMENT (OUTPATIENT)
Dept: LAB | Facility: HOSPITAL | Age: 29
End: 2024-08-15
Payer: COMMERCIAL

## 2024-08-15 DIAGNOSIS — D70.8 OTHER NEUTROPENIA (HCC): ICD-10-CM

## 2024-08-15 DIAGNOSIS — R61 UNEXPLAINED NIGHT SWEATS: ICD-10-CM

## 2024-08-15 DIAGNOSIS — Z86.19 FREQUENT INFECTIONS: ICD-10-CM

## 2024-08-15 DIAGNOSIS — R61 UNEXPLAINED NIGHT SWEATS: Primary | ICD-10-CM

## 2024-08-15 DIAGNOSIS — R53.83 FATIGUE, UNSPECIFIED TYPE: ICD-10-CM

## 2024-08-15 DIAGNOSIS — R71.8 ELEVATED RED BLOOD CELL COUNT: ICD-10-CM

## 2024-08-15 DIAGNOSIS — R71.8 MICROCYTOSIS: ICD-10-CM

## 2024-08-15 LAB
ANA SER QL IA: NEGATIVE
CRP SERPL QL: 4.3 MG/L
ERYTHROCYTE [SEDIMENTATION RATE] IN BLOOD: 13 MM/HOUR (ref 0–14)
FERRITIN SERPL-MCNC: 174 NG/ML (ref 24–336)
FOLATE SERPL-MCNC: 14.1 NG/ML
IRON SATN MFR SERPL: 41 % (ref 15–50)
IRON SERPL-MCNC: 136 UG/DL (ref 50–212)
LDH SERPL-CCNC: 130 U/L (ref 140–271)
TIBC SERPL-MCNC: 331 UG/DL (ref 250–450)
UIBC SERPL-MCNC: 195 UG/DL (ref 155–355)
VIT B12 SERPL-MCNC: 281 PG/ML (ref 180–914)

## 2024-08-15 PROCEDURE — 74177 CT ABD & PELVIS W/CONTRAST: CPT

## 2024-08-15 PROCEDURE — 82728 ASSAY OF FERRITIN: CPT

## 2024-08-15 PROCEDURE — 83615 LACTATE (LD) (LDH) ENZYME: CPT

## 2024-08-15 PROCEDURE — 86038 ANTINUCLEAR ANTIBODIES: CPT

## 2024-08-15 PROCEDURE — 83550 IRON BINDING TEST: CPT

## 2024-08-15 PROCEDURE — 71260 CT THORAX DX C+: CPT

## 2024-08-15 PROCEDURE — 88184 FLOWCYTOMETRY/ TC 1 MARKER: CPT

## 2024-08-15 PROCEDURE — 36415 COLL VENOUS BLD VENIPUNCTURE: CPT

## 2024-08-15 PROCEDURE — 82607 VITAMIN B-12: CPT

## 2024-08-15 PROCEDURE — 82746 ASSAY OF FOLIC ACID SERUM: CPT

## 2024-08-15 PROCEDURE — 86140 C-REACTIVE PROTEIN: CPT

## 2024-08-15 PROCEDURE — 85652 RBC SED RATE AUTOMATED: CPT

## 2024-08-15 PROCEDURE — 83540 ASSAY OF IRON: CPT

## 2024-08-15 RX ADMIN — IOHEXOL 100 ML: 350 INJECTION, SOLUTION INTRAVENOUS at 13:05

## 2024-08-17 LAB — SCAN RESULT: NORMAL

## 2024-08-21 ENCOUNTER — OFFICE VISIT (OUTPATIENT)
Dept: HEMATOLOGY ONCOLOGY | Facility: CLINIC | Age: 29
End: 2024-08-21
Payer: COMMERCIAL

## 2024-08-21 VITALS
OXYGEN SATURATION: 98 % | TEMPERATURE: 98 F | WEIGHT: 218 LBS | RESPIRATION RATE: 18 BRPM | HEIGHT: 74 IN | SYSTOLIC BLOOD PRESSURE: 120 MMHG | HEART RATE: 75 BPM | BODY MASS INDEX: 27.98 KG/M2 | DIASTOLIC BLOOD PRESSURE: 80 MMHG

## 2024-08-21 DIAGNOSIS — E53.8 VITAMIN B12 DEFICIENCY: ICD-10-CM

## 2024-08-21 DIAGNOSIS — D70.8 OTHER NEUTROPENIA (HCC): Primary | ICD-10-CM

## 2024-08-21 DIAGNOSIS — E53.8 FOLATE DEFICIENCY: ICD-10-CM

## 2024-08-21 PROCEDURE — 99214 OFFICE O/P EST MOD 30 MIN: CPT

## 2024-08-21 RX ORDER — LANOLIN ALCOHOL/MO/W.PET/CERES
1000 CREAM (GRAM) TOPICAL DAILY
Qty: 90 TABLET | Refills: 0 | Status: SHIPPED | OUTPATIENT
Start: 2024-08-21

## 2024-08-21 RX ORDER — DIPHENOXYLATE HYDROCHLORIDE AND ATROPINE SULFATE 2.5; .025 MG/1; MG/1
1 TABLET ORAL DAILY
Qty: 90 TABLET | Refills: 0 | Status: SHIPPED | OUTPATIENT
Start: 2024-08-21

## 2024-08-26 PROBLEM — E53.8 VITAMIN B12 DEFICIENCY: Status: ACTIVE | Noted: 2024-08-26

## 2024-08-27 NOTE — PROGRESS NOTES
HEMATOLOGY / ONCOLOGY CLINIC FOLLOW UP NOTE    Primary Care Provider: NEDA Weiner  Referring Provider:    MRN: [id number]  : 1995    Reason for Encounter: Follow-up neutropenia           Interval History: Patient presents for follow-up of his neutropenia.  He has been his significant other.  Patient is primarily Bengali-speaking,  service used during office visit,  ID #[id number].    We had ordered some labs and imaging after the initial consultation on 2024.  Patient is here to review the results.     Labs:   2024:  serum iron 136, iron saturation 41%, ferritin 174, , folate 14.1, vitamin B12 281, MATT screen negative, CRP 4.3, sed rate 13, thalassemia test pending    Flow cytometry: Negative    2024: WBC 3.24, ANC 1.35, lymphocytes 45%, Hgb 15.9, MCV 77, RBC 6.22, ALT 56, AST 24, T. Bili 1.07     2023: WBC 3.03, ANC 1.00, lymphocytes 50%, Hgb 15.4, MCV 80, RBC 5.93, ALT 64, AST 29, T. Bili 0.86, HIV panel non-reactive    8/15/2024 CT abdomen chest pelvis:  No significant findings in the chest abdomen or pelvis  No lymphadenopathy or suspicious masses noted    REVIEW OF SYSTEMS:  Please note that a 14-point review of systems was performed to include Constitutional, HEENT, Respiratory, CVS, GI, , Musculoskeletal, Integumentary, Neurologic, Rheumatologic, Endocrinologic, Psychiatric, Lymphatic, and Hematologic/Oncologic systems were reviewed and are negative unless otherwise stated in HPI. Positive and negative findings pertinent to this evaluation are incorporated into the history of present illness.      ECOG PS: 0    HPI:  Jose Bennett was seen for initial consultation 2024 regarding neutropenia.  Patient has a PMH significant for chronic back pain, cervical radiculopathy.  He is here with his significant other.  Patient is primarily Bengali speaking, office visit completed using  service,  ID# [id number].       Patient  only has two sets of labs in his history, which are dated 5/31/2023 and 8/5/2023.  Patient is noted to be neutropenic since 2023.  Patient endorses increased fatigue, frequent infections, increased frequency of headaches, drenching night sweats and chest pain.  Denies fevers, decreased appetite or unintentional weight loss.  Patient denies diarrhea or constipation.  No palpitations or SOB.  Patient denies abnormal bleeding: epistaxis, gingival bleeding, hematuria, dark tarry stools.       Patient reported tenderness RUQ on palpation.  No lymphadenopathy or hepatosplenomegaly noted on assessment.     Patient is a non-smoker, drinks alcohol socially.  Denies a family history of cancer.         PROBLEM LIST:  Patient Active Problem List   Diagnosis    Hemorrhoid    Anxiety    Tendinopathy of left shoulder    Cervical radiculopathy    Chronic left-sided low back pain with left-sided sciatica    Other neutropenia (HCC)    Fatigue    Unexplained night sweats    Microcytosis    Frequent infections       Assessment / Plan: 29-year-old male with neutropenia.  We reviewed that his CT imaging was negative for any lymphadenopathy or suspicious masses.  At this time, the etiology of his neutropenia is unclear.  Informed him that his vitamin B12 is on the low end of normal, which could be contributing therefore, recommend he start vitamin B12 1000 mcg daily.  In addition, recommend he start a multivitamin daily as well.    We will have a close follow-up in 2 months with labs prior (CBCD, vitamin B12, folate).  Patient agreeable with this plan.  He is aware to contact us for any additional questions/concerns or worsening symptoms.        I spent 30 minutes on chart review, face to face counseling time, coordination of care and documentation.    Past Medical History:   has a past medical history of Hemorrhoid.    PAST SURGICAL HISTORY:   has a past surgical history that includes Leg Surgery.    CURRENT MEDICATIONS  Current  "Outpatient Medications   Medication Sig Dispense Refill    multivitamin (THERAGRAN) TABS Take 1 tablet by mouth daily 90 tablet 0    vitamin B-12 (VITAMIN B-12) 1,000 mcg tablet Take 1 tablet (1,000 mcg total) by mouth daily 90 tablet 0    Cholecalciferol (VITAMIN D3) 1,000 units tablet Take 1 tablet (1,000 Units total) by mouth daily 90 tablet 0    Diclofenac Sodium (VOLTAREN) 1 % Apply 2 g topically 4 (four) times a day 350 g 1    escitalopram (Lexapro) 10 mg tablet Take 1 tablet (10 mg total) by mouth daily 90 tablet 0    hydrocortisone 2.5 % cream Apply topically 3 (three) times a day 28 g 1    ibuprofen (MOTRIN) 200 mg tablet Take 600 mg by mouth every 6 (six) hours as needed      lidocaine (XYLOCAINE) 5 % ointment Apply topically as needed for mild pain 35.44 g 0    methocarbamol (ROBAXIN) 500 mg tablet Take 1 tablet (500 mg total) by mouth 4 (four) times a day 90 tablet 0     No current facility-administered medications for this visit.     [unfilled]    SOCIAL HISTORY:   reports that he has never smoked. He has never used smokeless tobacco. He reports that he does not drink alcohol and does not use drugs.     FAMILY HISTORY:  family history includes Cancer in his maternal grandmother; Diabetes in his paternal grandmother; No Known Problems in his brother, father, mother, and sister.     ALLERGIES:  is allergic to sea[place] [fish oil - food allergy].      Physical Exam:  Vital Signs:   Visit Vitals  /80 (BP Location: Left arm, Patient Position: Sitting, Cuff Size: Adult)   Pulse 75   Temp 98 °F (36.7 °C)   Resp 18   Ht 6' 2\" (1.88 m)   Wt 98.9 kg (218 lb)   SpO2 98%   BMI 27.99 kg/m²   Smoking Status Never   BSA 2.25 m²     Body mass index is 27.99 kg/m².  Body surface area is 2.25 meters squared.    GEN: Alert, awake oriented x3, in no acute distress  HEENT- No pallor, icterus, cyanosis, no oral mucosal lesions,   LAD - no palpable cervical, clavicle, axillary, inguinal LAD  Heart- normal S1 S2, regular " rate and rhythm, No murmur, rubs.   Lungs- clear breathing sound bilateral.   Abdomen- soft, Non tender, bowel sounds present  Extremities- No cyanosis, clubbing, edema  Neuro- No focal neurological deficit    Labs:  Lab Results   Component Value Date    WBC 3.24 (L) 08/05/2024    HGB 15.9 08/05/2024    HCT 47.6 08/05/2024    MCV 77 (L) 08/05/2024     08/05/2024     Lab Results   Component Value Date    SODIUM 139 08/05/2024    K 3.8 08/05/2024     08/05/2024    CO2 28 08/05/2024    AGAP 9 08/05/2024    BUN 14 08/05/2024    CREATININE 1.08 08/05/2024    GLUC 115 08/05/2024    GLUF 95 05/31/2023    CALCIUM 9.8 08/05/2024    AST 24 08/05/2024    ALT 56 (H) 08/05/2024    ALKPHOS 66 08/05/2024    TP 7.6 08/05/2024    TBILI 1.07 (H) 08/05/2024    EGFR 92 08/05/2024

## 2024-09-12 ENCOUNTER — OFFICE VISIT (OUTPATIENT)
Dept: FAMILY MEDICINE CLINIC | Facility: CLINIC | Age: 29
End: 2024-09-12

## 2024-09-12 VITALS
WEIGHT: 220.5 LBS | BODY MASS INDEX: 28.3 KG/M2 | SYSTOLIC BLOOD PRESSURE: 118 MMHG | HEIGHT: 74 IN | TEMPERATURE: 97.5 F | RESPIRATION RATE: 16 BRPM | HEART RATE: 74 BPM | DIASTOLIC BLOOD PRESSURE: 74 MMHG | OXYGEN SATURATION: 98 %

## 2024-09-12 DIAGNOSIS — Z00.00 ANNUAL PHYSICAL EXAM: Primary | ICD-10-CM

## 2024-09-12 DIAGNOSIS — Z23 ENCOUNTER FOR IMMUNIZATION: ICD-10-CM

## 2024-09-12 DIAGNOSIS — K64.9 HEMORRHOIDS, UNSPECIFIED HEMORRHOID TYPE: ICD-10-CM

## 2024-09-12 DIAGNOSIS — F41.9 ANXIETY: ICD-10-CM

## 2024-09-12 PROCEDURE — 99213 OFFICE O/P EST LOW 20 MIN: CPT

## 2024-09-12 PROCEDURE — 99395 PREV VISIT EST AGE 18-39: CPT

## 2024-09-12 PROCEDURE — 90656 IIV3 VACC NO PRSV 0.5 ML IM: CPT

## 2024-09-12 PROCEDURE — 90715 TDAP VACCINE 7 YRS/> IM: CPT

## 2024-09-12 PROCEDURE — 90471 IMMUNIZATION ADMIN: CPT

## 2024-09-12 PROCEDURE — 90472 IMMUNIZATION ADMIN EACH ADD: CPT

## 2024-09-12 RX ORDER — LIDOCAINE 50 MG/G
OINTMENT TOPICAL AS NEEDED
Qty: 35.44 G | Refills: 0 | Status: SHIPPED | OUTPATIENT
Start: 2024-09-12

## 2024-09-12 RX ORDER — HYDROCORTISONE 2.5 %
CREAM (GRAM) TOPICAL 3 TIMES DAILY
Qty: 28 G | Refills: 1 | Status: SHIPPED | OUTPATIENT
Start: 2024-09-12

## 2024-09-12 NOTE — ASSESSMENT & PLAN NOTE
JUVE-7 score improved from 6.  He reports that he is doing better and well, prefers to stay on same dose. Continue Lexapro 10 mg daily.

## 2024-09-12 NOTE — PATIENT INSTRUCTIONS
"Patient Education     Routine physical for adults   The Basics   Written by the doctors and editors at [facility]   What is a physical? -- A physical is a routine visit, or \"check-up,\" with your doctor. You might also hear it called a \"wellness visit\" or \"preventive visit.\"  During each visit, the doctor will:   Ask about your physical and mental health   Ask about your habits, behaviors, and lifestyle   Do an exam   Give you vaccines if needed   Talk to you about any medicines you take   Give advice about your health   Answer your questions  Getting regular check-ups is an important part of taking care of your health. It can help your doctor find and treat any problems you have. But it's also important for preventing health problems.  A routine physical is different from a \"sick visit.\" A sick visit is when you see a doctor because of a health concern or problem. Since physicals are scheduled ahead of time, you can think about what you want to ask the doctor.  How often should I get a physical? -- It depends on your age and health. In general, for people age 21 years and older:   If you are younger than 50 years, you might be able to get a physical every 3 years.   If you are 50 years or older, your doctor might recommend a physical every year.  If you have an ongoing health condition, like diabetes or high blood pressure, your doctor will probably want to see you more often.  What happens during a physical? -- In general, each visit will include:   Physical exam - The doctor or nurse will check your height, weight, heart rate, and blood pressure. They will also look at your eyes and ears. They will ask about how you are feeling and whether you have any symptoms that bother you.   Medicines - It's a good idea to bring a list of all the medicines you take to each doctor visit. Your doctor will talk to you about your medicines and answer any questions. Tell them if you are having any side effects that bother you. You " "should also tell them if you are having trouble paying for any of your medicines.   Habits and behaviors - This includes:   Your diet   Your exercise habits   Whether you smoke, drink alcohol, or use drugs   Whether you are sexually active   Whether you feel safe at home  Your doctor will talk to you about things you can do to improve your health and lower your risk of health problems. They will also offer help and support. For example, if you want to quit smoking, they can give you advice and might prescribe medicines. If you want to improve your diet or get more physical activity, they can help you with this, too.   Lab tests, if needed - The tests you get will depend on your age and situation. For example, your doctor might want to check your:   Cholesterol   Blood sugar   Iron level   Vaccines - The recommended vaccines will depend on your age, health, and what vaccines you already had. Vaccines are very important because they can prevent certain serious or deadly infections.   Discussion of screening - \"Screening\" means checking for diseases or other health problems before they cause symptoms. Your doctor can recommend screening based on your age, risk, and preferences. This might include tests to check for:   Cancer, such as breast, prostate, cervical, ovarian, colorectal, prostate, lung, or skin cancer   Sexually transmitted infections, such as chlamydia and gonorrhea   Mental health conditions like depression and anxiety  Your doctor will talk to you about the different types of screening tests. They can help you decide which screenings to have. They can also explain what the results might mean.   Answering questions - The physical is a good time to ask the doctor or nurse questions about your health. If needed, they can refer you to other doctors or specialists, too.  Adults older than 65 years often need other care, too. As you get older, your doctor will talk to you about:   How to prevent falling at " home   Hearing or vision tests   Memory testing   How to take your medicines safely   Making sure that you have the help and support you need at home  All topics are updated as new evidence becomes available and our peer review process is complete.  This topic retrieved from [employer] on: May 02, 2024.  Topic 785884 Version 1.0  Release: 32.4.3 - C32.122  © 2024 UpToDate, Inc. and/or its affiliates. All rights reserved.  Consumer Information Use and Disclaimer   Disclaimer: This generalized information is a limited summary of diagnosis, treatment, and/or medication information. It is not meant to be comprehensive and should be used as a tool to help the user understand and/or assess potential diagnostic and treatment options. It does NOT include all information about conditions, treatments, medications, side effects, or risks that may apply to a specific patient. It is not intended to be medical advice or a substitute for the medical advice, diagnosis, or treatment of a health care provider based on the health care provider's examination and assessment of a patient's specific and unique circumstances. Patients must speak with a health care provider for complete information about their health, medical questions, and treatment options, including any risks or benefits regarding use of medications. This information does not endorse any treatments or medications as safe, effective, or approved for treating a specific patient. UpToDate, Inc. and its affiliates disclaim any warranty or liability relating to this information or the use thereof.The use of this information is governed by the Terms of Use, available at https://www.woltersLinkuriousuwer.com/en/know/clinical-effectiveness-terms. 2024© UpToDate, Inc. and its affiliates and/or licensors. All rights reserved.  Copyright   © 2024 UpToDate, Inc. and/or its affiliates. All rights reserved.

## 2024-09-12 NOTE — ASSESSMENT & PLAN NOTE
Orders:    hydrocortisone 2.5 % cream; Apply topically 3 (three) times a day    lidocaine (XYLOCAINE) 5 % ointment; Apply topically as needed for mild pain

## 2024-09-12 NOTE — PROGRESS NOTES
Adult Annual Physical  Name: Jose Bennett      : 1995      MRN: [id number]  Encounter Provider: NEDA Weiner  Encounter Date: 2024   Encounter department: [facility]    Assessment & Plan  Annual physical exam         Anxiety  JUVE-7 score improved from 6.  He reports that he is doing better and well, prefers to stay on same dose. Continue Lexapro 10 mg daily.         Hemorrhoids, unspecified hemorrhoid type    Orders:    hydrocortisone 2.5 % cream; Apply topically 3 (three) times a day    lidocaine (XYLOCAINE) 5 % ointment; Apply topically as needed for mild pain    Encounter for immunization    Orders:    TDAP VACCINE GREATER THAN OR EQUAL TO 8YO IM    influenza vaccine preservative-free 0.5 mL IM (Fluzone, Afluria, Fluarix, Flulaval)    Immunizations and preventive care screenings were discussed with patient today. Appropriate education was printed on patient's after visit summary.    Counseling:  Dental Health: discussed importance of regular tooth brushing, flossing, and dental visits.  Exercise: the importance of regular exercise/physical activity was discussed. Recommend exercise 3-5 times per week for at least 30 minutes.          History of Present Illness   Started lexapro in August. States he feels much better. Complains of hemorrhoids requiring treatment daily.       Adult Annual Physical:  Patient presents for annual physical.     Diet and Physical Activity:  - Diet/Nutrition: well balanced diet and consuming 3-5 servings of fruits/vegetables daily.  - Exercise: no formal exercise and walking.    General Health:  - Sleep: 7-8 hours of sleep on average.  - Hearing: normal hearing right ear.  - Vision: no vision problems.  - Dental: regular dental visits.     Health:  - History of STDs: no.   - Urinary symptoms: none.     Review of Systems   Constitutional:  Negative for chills and fever.   HENT:  Negative for ear pain and sore throat.   "  Eyes:  Negative for pain and visual disturbance.   Respiratory:  Negative for cough and shortness of breath.    Cardiovascular:  Negative for chest pain and palpitations.   Gastrointestinal:  Negative for abdominal pain and vomiting.   Genitourinary:  Negative for dysuria and hematuria.   Musculoskeletal:  Negative for arthralgias and back pain.   Skin:  Negative for color change and rash.   Neurological:  Negative for seizures and syncope.   All other systems reviewed and are negative.            Objective     /74 (BP Location: Left arm, Patient Position: Sitting, Cuff Size: Large)   Pulse 74   Temp 97.5 °F (36.4 °C) (Temporal)   Resp 16   Ht 6' 2\" (1.88 m)   Wt 100 kg (220 lb 8 oz)   SpO2 98%   BMI 28.31 kg/m²     Physical Exam  Vitals and nursing note reviewed.   Constitutional:       General: He is not in acute distress.     Appearance: He is well-developed and overweight.   HENT:      Head: Normocephalic and atraumatic.      Right Ear: External ear normal.      Left Ear: External ear normal.      Nose: Nose normal.   Eyes:      Conjunctiva/sclera: Conjunctivae normal.   Cardiovascular:      Rate and Rhythm: Normal rate and regular rhythm.      Pulses: Normal pulses.      Heart sounds: Normal heart sounds. No murmur heard.  Pulmonary:      Effort: Pulmonary effort is normal. No respiratory distress.      Breath sounds: Normal breath sounds.   Abdominal:      Palpations: Abdomen is soft.      Tenderness: There is no abdominal tenderness.   Musculoskeletal:         General: No swelling. Normal range of motion.      Cervical back: Normal range of motion and neck supple.   Skin:     General: Skin is warm and dry.      Capillary Refill: Capillary refill takes less than 2 seconds.   Neurological:      General: No focal deficit present.      Mental Status: He is alert and oriented to person, place, and time. Mental status is at baseline.   Psychiatric:         Mood and Affect: Mood normal.         " Behavior: Behavior normal.         Thought Content: Thought content normal.         Judgment: Judgment normal.

## 2024-10-17 ENCOUNTER — APPOINTMENT (OUTPATIENT)
Dept: LAB | Facility: HOSPITAL | Age: 29
End: 2024-10-17
Payer: COMMERCIAL

## 2024-10-17 DIAGNOSIS — E53.8 VITAMIN B12 DEFICIENCY: ICD-10-CM

## 2024-10-17 DIAGNOSIS — E53.8 FOLATE DEFICIENCY: ICD-10-CM

## 2024-10-17 DIAGNOSIS — D70.8 OTHER NEUTROPENIA (HCC): ICD-10-CM

## 2024-10-17 LAB
BASOPHILS # BLD AUTO: 0.02 THOUSANDS/ΜL (ref 0–0.1)
BASOPHILS NFR BLD AUTO: 1 % (ref 0–1)
EOSINOPHIL # BLD AUTO: 0.06 THOUSAND/ΜL (ref 0–0.61)
EOSINOPHIL NFR BLD AUTO: 2 % (ref 0–6)
ERYTHROCYTE [DISTWIDTH] IN BLOOD BY AUTOMATED COUNT: 13.2 % (ref 11.6–15.1)
FOLATE SERPL-MCNC: >22.3 NG/ML
HCT VFR BLD AUTO: 46.5 % (ref 36.5–49.3)
HGB BLD-MCNC: 15.6 G/DL (ref 12–17)
IMM GRANULOCYTES # BLD AUTO: 0 THOUSAND/UL (ref 0–0.2)
IMM GRANULOCYTES NFR BLD AUTO: 0 % (ref 0–2)
LYMPHOCYTES # BLD AUTO: 1.43 THOUSANDS/ΜL (ref 0.6–4.47)
LYMPHOCYTES NFR BLD AUTO: 46 % (ref 14–44)
MCH RBC QN AUTO: 25.7 PG (ref 26.8–34.3)
MCHC RBC AUTO-ENTMCNC: 33.5 G/DL (ref 31.4–37.4)
MCV RBC AUTO: 77 FL (ref 82–98)
MONOCYTES # BLD AUTO: 0.25 THOUSAND/ΜL (ref 0.17–1.22)
MONOCYTES NFR BLD AUTO: 8 % (ref 4–12)
NEUTROPHILS # BLD AUTO: 1.31 THOUSANDS/ΜL (ref 1.85–7.62)
NEUTS SEG NFR BLD AUTO: 43 % (ref 43–75)
NRBC BLD AUTO-RTO: 0 /100 WBCS
PLATELET # BLD AUTO: 211 THOUSANDS/UL (ref 149–390)
PMV BLD AUTO: 11.3 FL (ref 8.9–12.7)
RBC # BLD AUTO: 6.07 MILLION/UL (ref 3.88–5.62)
VIT B12 SERPL-MCNC: 709 PG/ML (ref 180–914)
WBC # BLD AUTO: 3.07 THOUSAND/UL (ref 4.31–10.16)

## 2024-10-17 PROCEDURE — 85025 COMPLETE CBC W/AUTO DIFF WBC: CPT

## 2024-10-17 PROCEDURE — 36415 COLL VENOUS BLD VENIPUNCTURE: CPT

## 2024-10-17 PROCEDURE — 82746 ASSAY OF FOLIC ACID SERUM: CPT

## 2024-10-17 PROCEDURE — 82607 VITAMIN B-12: CPT

## 2024-10-21 ENCOUNTER — OFFICE VISIT (OUTPATIENT)
Dept: HEMATOLOGY ONCOLOGY | Facility: CLINIC | Age: 29
End: 2024-10-21
Payer: COMMERCIAL

## 2024-10-21 VITALS
DIASTOLIC BLOOD PRESSURE: 82 MMHG | HEIGHT: 74 IN | SYSTOLIC BLOOD PRESSURE: 124 MMHG | OXYGEN SATURATION: 98 % | WEIGHT: 221 LBS | RESPIRATION RATE: 17 BRPM | BODY MASS INDEX: 28.36 KG/M2 | TEMPERATURE: 98 F | HEART RATE: 96 BPM

## 2024-10-21 DIAGNOSIS — E53.8 FOLATE DEFICIENCY: ICD-10-CM

## 2024-10-21 DIAGNOSIS — E53.8 VITAMIN B12 DEFICIENCY: ICD-10-CM

## 2024-10-21 DIAGNOSIS — D70.8 OTHER NEUTROPENIA (HCC): Primary | ICD-10-CM

## 2024-10-21 PROCEDURE — 99213 OFFICE O/P EST LOW 20 MIN: CPT

## 2024-10-21 RX ORDER — LANOLIN ALCOHOL/MO/W.PET/CERES
1000 CREAM (GRAM) TOPICAL EVERY OTHER DAY
Qty: 90 TABLET | Refills: 1 | Status: SHIPPED | OUTPATIENT
Start: 2024-10-21

## 2024-10-21 RX ORDER — DIPHENOXYLATE HYDROCHLORIDE AND ATROPINE SULFATE 2.5; .025 MG/1; MG/1
1 TABLET ORAL DAILY
Qty: 90 TABLET | Refills: 1 | Status: SHIPPED | OUTPATIENT
Start: 2024-10-21

## 2024-10-21 NOTE — PROGRESS NOTES
HEMATOLOGY / ONCOLOGY CLINIC FOLLOW UP NOTE    Primary Care Provider: NEDA Weiner  Referring Provider:    MRN: [id number]  : 1995    Reason for Encounter: Follow-up neutropenia           Interval History: Patient presents for follow-up of his neutropenia. He has been his significant other. Patient is primarily [place]-speaking,  service used during office visit,  ID #[id number].    Patient is doing well overall.  He is not feeling as fatigued as before, reports he has more energy.  In addition, his drenching night sweats have resolved.  Denies any fevers, frequent infections, decreased appetite or unintentional weight loss.  No abdominal pain, fullness, diarrhea or constipation.    Patient continues to take vitamin B12 1000 mcg supplements daily.  He is also taking a multivitamin.    Labs:   10/17/2024: WBC 3.07, ANC 1.31, lymphocytes 46%, Hgb 15.6, MCV 77, vitamin B12 709, folate >22.3    2024:  serum iron 136, iron saturation 41%, ferritin 174, , folate 14.1, vitamin B12 281, MATT screen negative, CRP 4.3, sed rate 13, thalassemia test pending     Flow cytometry: Negative     2024: WBC 3.24, ANC 1.35, lymphocytes 45%, Hgb 15.9, MCV 77, RBC 6.22, ALT 56, AST 24, T. Bili 1.07     2023: WBC 3.03, ANC 1.00, lymphocytes 50%, Hgb 15.4, MCV 80, RBC 5.93, ALT 64, AST 29, T. Bili 0.86, HIV panel non-reactive     8/15/2024 CT abdomen chest pelvis:  No significant findings in the chest abdomen or pelvis  No lymphadenopathy or suspicious masses noted         REVIEW OF SYSTEMS:  Please note that a 14-point review of systems was performed to include Constitutional, HEENT, Respiratory, CVS, GI, , Musculoskeletal, Integumentary, Neurologic, Rheumatologic, Endocrinologic, Psychiatric, Lymphatic, and Hematologic/Oncologic systems were reviewed and are negative unless otherwise stated in HPI. Positive and negative findings pertinent to this evaluation are incorporated  into the history of present illness.      ECOG PS: 0    HPI:  Jose Bennett was seen for initial consultation 8/9/2024 regarding neutropenia.  Patient has a PMH significant for chronic back pain, cervical radiculopathy.  He is here with his significant other.  Patient is primarily [place] speaking, office visit completed using  service,  ID# [id number].       Patient only has two sets of labs in his history, which are dated 5/31/2023 and 8/5/2023.  Patient is noted to be neutropenic since 2023.  Patient endorses increased fatigue, frequent infections, increased frequency of headaches, drenching night sweats and chest pain.  Denies fevers, decreased appetite or unintentional weight loss.  Patient denies diarrhea or constipation.  No palpitations or SOB.  Patient denies abnormal bleeding: epistaxis, gingival bleeding, hematuria, dark tarry stools.       Patient reported tenderness RUQ on palpation.  No lymphadenopathy or hepatosplenomegaly noted on assessment.     Patient is a non-smoker, drinks alcohol socially.  Denies a family history of cancer.    8/21/2024: Start vitamin B12 1000 mcg daily plus a multivitamin daily    10/21/2024: Take vitamin B12 1000 mcg every other day plus multivitamin daily      PROBLEM LIST:  Patient Active Problem List   Diagnosis    Hemorrhoid    Anxiety    Tendinopathy of left shoulder    Cervical radiculopathy    Chronic left-sided low back pain with left-sided sciatica    Other neutropenia (HCC)    Fatigue    Unexplained night sweats    Microcytosis    Frequent infections    Folate deficiency       Assessment / Plan: 29-year-old male with neutropenia.  There is no clear etiology, my suspicion is this is congenital neutropenia.  It is asymptomatic and no red flag symptoms.  At this time, as ANC has not changed either.    Patient's vitamin B12 has improved therefore, recommend he take it every other day and can continue taking multivitamins daily.    Will see patient  back in the office in 4 months with repeat labs (CBCD, vitamin B12).  Advised patient to be vigilant of his symptoms and report any other symptoms he may be experiencing that we discussed above.  Patient agreeable with the plan.  He is aware to contact us for any additional questions/concerns or worsening symptoms.        I spent 20 minutes on chart review, face to face counseling time, coordination of care and documentation.    Past Medical History:   has a past medical history of Hemorrhoid.    PAST SURGICAL HISTORY:   has a past surgical history that includes Leg Surgery.    CURRENT MEDICATIONS  Current Outpatient Medications   Medication Sig Dispense Refill    multivitamin (THERAGRAN) TABS Take 1 tablet by mouth daily 90 tablet 1    vitamin B-12 (VITAMIN B-12) 1,000 mcg tablet Take 1 tablet (1,000 mcg total) by mouth every other day 90 tablet 1    Cholecalciferol (VITAMIN D3) 1,000 units tablet Take 1 tablet (1,000 Units total) by mouth daily 90 tablet 0    Diclofenac Sodium (VOLTAREN) 1 % Apply 2 g topically 4 (four) times a day 350 g 1    escitalopram (Lexapro) 10 mg tablet Take 1 tablet (10 mg total) by mouth daily 90 tablet 0    hydrocortisone 2.5 % cream Apply topically 3 (three) times a day 28 g 1    ibuprofen (MOTRIN) 200 mg tablet Take 600 mg by mouth every 6 (six) hours as needed      lidocaine (XYLOCAINE) 5 % ointment Apply topically as needed for mild pain 35.44 g 0    methocarbamol (ROBAXIN) 500 mg tablet Take 1 tablet (500 mg total) by mouth 4 (four) times a day 90 tablet 0     No current facility-administered medications for this visit.     [unfilled]    SOCIAL HISTORY:   reports that he has never smoked. He has never used smokeless tobacco. He reports that he does not drink alcohol and does not use drugs.     FAMILY HISTORY:  family history includes Cancer in his maternal grandmother; Diabetes in his paternal grandmother; No Known Problems in his brother, father, mother, and sister.  "    ALLERGIES:  is allergic to sea-omega [fish oil - food allergy].      Physical Exam:  Vital Signs:   Visit Vitals  /82 (BP Location: Left arm, Patient Position: Sitting, Cuff Size: Adult)   Pulse 96   Temp 98 °F (36.7 °C)   Resp 17   Ht 6' 2\" (1.88 m)   Wt 100 kg (221 lb)   SpO2 98%   BMI 28.37 kg/m²   Smoking Status Never   BSA 2.27 m²     Body mass index is 28.37 kg/m².  Body surface area is 2.26 meters squared.    GEN: Alert, awake oriented x3, in no acute distress  HEENT- No pallor, icterus, cyanosis, no oral mucosal lesions,   LAD - no palpable cervical, clavicle, axillary, inguinal LAD  Heart- normal S1 S2, regular rate and rhythm, No murmur, rubs.   Lungs- clear breathing sound bilateral.   Abdomen- soft, Non tender, bowel sounds present  Extremities- No cyanosis, clubbing, edema  Neuro- No focal neurological deficit    Labs:  Lab Results   Component Value Date    WBC 3.07 (L) 10/17/2024    HGB 15.6 10/17/2024    HCT 46.5 10/17/2024    MCV 77 (L) 10/17/2024     10/17/2024     Lab Results   Component Value Date    SODIUM 139 08/05/2024    K 3.8 08/05/2024     08/05/2024    CO2 28 08/05/2024    AGAP 9 08/05/2024    BUN 14 08/05/2024    CREATININE 1.08 08/05/2024    GLUC 115 08/05/2024    GLUF 95 05/31/2023    CALCIUM 9.8 08/05/2024    AST 24 08/05/2024    ALT 56 (H) 08/05/2024    ALKPHOS 66 08/05/2024    TP 7.6 08/05/2024    TBILI 1.07 (H) 08/05/2024    EGFR 92 08/05/2024     "

## 2024-10-28 DIAGNOSIS — F41.9 ANXIETY: ICD-10-CM

## 2024-10-28 RX ORDER — ESCITALOPRAM OXALATE 10 MG/1
10 TABLET ORAL DAILY
Qty: 90 TABLET | Refills: 0 | Status: SHIPPED | OUTPATIENT
Start: 2024-10-28

## 2025-02-21 DIAGNOSIS — F41.9 ANXIETY: ICD-10-CM

## 2025-02-24 RX ORDER — ESCITALOPRAM OXALATE 10 MG/1
10 TABLET ORAL DAILY
Qty: 90 TABLET | Refills: 0 | Status: SHIPPED | OUTPATIENT
Start: 2025-02-24